# Patient Record
Sex: MALE | Race: WHITE | URBAN - METROPOLITAN AREA
[De-identification: names, ages, dates, MRNs, and addresses within clinical notes are randomized per-mention and may not be internally consistent; named-entity substitution may affect disease eponyms.]

---

## 2019-11-07 ENCOUNTER — OFFICE VISIT (OUTPATIENT)
Dept: INTERNAL MEDICINE CLINIC | Age: 29
End: 2019-11-07

## 2019-11-07 VITALS
HEIGHT: 70 IN | WEIGHT: 160.4 LBS | OXYGEN SATURATION: 97 % | HEART RATE: 72 BPM | SYSTOLIC BLOOD PRESSURE: 115 MMHG | DIASTOLIC BLOOD PRESSURE: 75 MMHG | BODY MASS INDEX: 22.96 KG/M2 | RESPIRATION RATE: 16 BRPM

## 2019-11-07 DIAGNOSIS — M25.561 ACUTE PAIN OF RIGHT KNEE: Primary | ICD-10-CM

## 2019-11-07 NOTE — PROGRESS NOTES
Chief Complaint   Patient presents with    Knee Pain     he is a 26y.o. year old male who presents for evaluation of right knee pain   Pain Assessment Encounter      Natalia Hearn  11/7/2019  Onset of Symptoms: 3 weeks patient was coming landing from a parachute jump and misjudged landing with right knee absorbing most of his impact. Patient felt a pop. Drove home the next day he had a lot of effusion which has stayed constant since that  ________________________________________________________________________  Description: aching pain and limited ROM at times. Frequency: more than 5 times a day  Pain Scale:(1-10): 6  Trauma Hx: parachute landing   Hx of similar symptoms: No  Radiation: NO  Duration:  continuous      Progression: has worsened  What makes it better?: OTC meds and rest  What makes it worse?:exercise and walking  Medications tried: acetaminophen, ibuprofen    Reviewed and agree with Nurse Note and duplicated in this note. Reviewed PmHx, RxHx, FmHx, SocHx, AllgHx and updated and dated in the chart. Family History   Problem Relation Age of Onset    Cancer Maternal Grandmother     Diabetes Paternal Grandmother     Hypertension Paternal Grandfather      No past medical history on file.    Social History     Socioeconomic History    Marital status: SINGLE     Spouse name: Not on file    Number of children: Not on file    Years of education: Not on file    Highest education level: Not on file   Tobacco Use    Smoking status: Never Smoker   Substance and Sexual Activity    Alcohol use: Yes        Review of Systems - negative except as listed above      Objective:     Vitals:    11/07/19 1110   BP: 115/75   Pulse: 72   Resp: 16   SpO2: 97%   Weight: 160 lb 6.4 oz (72.8 kg)   Height: 5' 10\" (1.778 m)       Physical Examination: General appearance - alert, well appearing, and in no distress  Back exam - full range of motion, no tenderness, palpable spasm or pain on motion  Neurological - alert, oriented, normal speech, no focal findings or movement disorder noted  Musculoskeletal - right knee exam:  The patient'sright Knee  is  normal to inspection. The ROM is normal and there is flexion to Normal Effusion is: marked The joint exhibits Negative warmth and Crepitus is: mild. The Abby test is:  negative with limited motion due to pain Joint Line Tenderness is  negative . The [facility] Test is not tested  and the Lachman is  Indeterminate due to guarding. The Anterior Drawer is indeterminate due to guarding and effusion. The Posterior Drawer is:  negative. Valgus Stress (for MCL) is:  normal . Varus Stress (for LCL) is  normal  . The Deangelo Test is negative and the Apprehension Sign:  negative  Patellar Grind negative     Extremities - peripheral pulses normal, no pedal edema, no clubbing or cyanosis  Skin - normal coloration and turgor, no rashes, no suspicious skin lesions noted    Assessment/ Plan:   Diagnoses and all orders for this visit:    1. Acute pain of right knee  -     XR KNEE RT MIN 4 V; Future  -     MRI KNEE RT WO CONT; Future    MRI with concern of meniscal versus ACL injuries    Pathophysiology, recovery and rehabilitation process discussed and questions answered   Counseling for 30 Minutes of the total visit duration   Pictures and figures used as necessary   Provided reassurance   Phone f/u for MRI results  Recommend activity modification   Recommend  lower impact activities-walking, Eliptical, Nordic Track, cycling or swimming   Follow up in 4 week(s)  Xray's reviewed - within normal limits       1) Remember to stay active and/or exercise regularly (I suggest 30-45 minutes daily)   2) For reliable dietary information, go to www. [employer].org. You may wish to consider seeing the nutritionist at [facility] [phone], also consider the [address] I have discussed the diagnosis with the patient and the intended plan as seen in the above orders.   The patient has received an after-visit summary and questions were answered concerning future plans. Medication Side Effects and Warnings were discussed with patient,  Patient Labs were reviewed and or requested, and  Patient Past Records were reviewed and or requested  yes      Pt agrees to call or return to clinic and/or go to closest ER with any worsening of symptoms. This may include, but not limited to increased fever (>100.4) with NSAIDS or Tylenol, increased edema, confusion, rash, worsening of presenting symptoms.